# Patient Record
(demographics unavailable — no encounter records)

---

## 2024-10-28 NOTE — PHYSICAL EXAM
[Alert] : alert [Normocephalic] : normocephalic [Flat Open Anterior [place]] : flat open anterior fontanelle [PERRL] : PERRL [Red Reflex Bilateral] : red reflex bilateral [Normally Placed Ears] : normally placed ears [Auricles Well Formed] : auricles well formed [Nares Patent] : nares patent [Palate Intact] : palate intact [Uvula Midline] : uvula midline [Supple, full passive range of motion] : supple, full passive range of motion [Symmetric Chest Rise] : symmetric chest rise [Clear to Auscultation Bilaterally] : clear to auscultation bilaterally [Regular Rate and Rhythm] : regular rate and rhythm [S1, S2 present] : S1, S2 present [+2 Femoral Pulses] : +2 femoral pulses [Soft] : soft [Bowel Sounds] : bowel sounds present [Umbilical Stump Dry, Clean, Intact] : umbilical stump dry, clean, intact [Normal external genitalia] : normal external genitalia [Patent Vagina] : patent vagina [Patent] : patent [Normally Placed] : normally placed [No Abnormal Lymph Nodes Palpated] : no abnormal lymph nodes palpated [Symmetric Flexed Extremities] : symmetric flexed extremities [Startle Reflex] : startle reflex present [Suck Reflex] : suck reflex present [Rooting] : rooting reflex present [Palmar Grasp] : palmar grasp present [Plantar Grasp] : plantar reflex present [Symmetric [place]] : symmetric [place] [Acute Distress] : no acute distress [Icteric sclera] : nonicteric sclera [Discharge] : no discharge [Palpable Masses] : no palpable masses [Murmurs] : no murmurs [Tender] : nontender [Distended] : not distended [Clitoromegaly] : no clitoromegaly [Nye-Ortolani] : negative Nye-Ortolani [Tuft of Hair] : no tuft of hair [Jaundice] : not jaundice

## 2024-10-28 NOTE — DISCUSSION/SUMMARY
[Normal Growth] : growth [Normal Development] : developmental [No Elimination Concerns] : elimination [Continue Regimen] : feeding [Normal Sleep Pattern] : sleep [Term Infant] : term infant [None] : no known medical problems [ Transition] :  transition [ Care] :  care [Nutritional Adequacy] : nutritional adequacy [Parental Well-Being] : parental well-being [Safety] : safety [Hepatitis B In Hospital] : Hepatitis B administered while in the hospital [No Vaccines] : no vaccines needed [Mother] : mother [Father] : father [FreeTextEntry1] : Venecia is an ex-40.1 F born via uncomplicated  now on DOL 5 here for  visit. She is doing well. She is up 1 oz from discharge weight but is still down 2.3% from birth weight. MOC is breastfeeding ad nohemi and supplementing with formula. She is stooling and voiding appropriately. No concerns on exam today. Paternal concern for jaundice, but no jaundice or scleral icterus on exam. TcB today is 10.2, below phototherapy threshold. She received both Hep B and Nirsevimab vaccination while in the  nursery, no vaccinations needed today. MOC is interested in lactation consultation but cannot see them today as she has another appointment. Will see lactation at next visit.   Recommend exclusive breastfeeding, 8-12 feedings per day. Mother should continue prenatal vitamins and avoid alcohol. If formula is needed, recommend iron-fortified formulations, 2-4 oz every 2-3 hrs. When in car, patient should be in rear-facing car seat in back seat. Put baby to sleep on back, in own crib with no loose or soft bedding. Help baby to develop sleep and feeding routines. May offer pacifier if needed. Start tummy time when awake. Limit baby's exposure to others, especially those with fever or unknown vaccine status. Parents counseled to call if rectal temperature >100.4 degrees F.   SDOH domains were screened and scored. Patient to return in 1 week for weight check.

## 2024-10-28 NOTE — DEVELOPMENTAL MILESTONES
[Normal Development] : Normal Development [None] : none [Makes brief eye contact] : makes brief eye contact [Cries with discomfort] : cries with discomfort [Calms to adult voice] : calms to adult voice [Reflexively moves arms and legs] : reflexively moves arms and legs [Turns head to side when on stomach] : turns head to side when on stomach [Holds fingers closed] : holds fingers closed [Grasps reflexively] : grasp reflexively [Passed] : passed [FreeTextEntry2] : 6

## 2024-10-28 NOTE — PHYSICAL EXAM
[Alert] : alert [Normocephalic] : normocephalic [Flat Open Anterior [place]] : flat open anterior fontanelle [PERRL] : PERRL [Red Reflex Bilateral] : red reflex bilateral [Normally Placed Ears] : normally placed ears [Auricles Well Formed] : auricles well formed [Nares Patent] : nares patent [Palate Intact] : palate intact [Uvula Midline] : uvula midline [Supple, full passive range of motion] : supple, full passive range of motion [Symmetric Chest Rise] : symmetric chest rise [Clear to Auscultation Bilaterally] : clear to auscultation bilaterally [Regular Rate and Rhythm] : regular rate and rhythm [S1, S2 present] : S1, S2 present [+2 Femoral Pulses] : +2 femoral pulses [Soft] : soft [Bowel Sounds] : bowel sounds present [Umbilical Stump Dry, Clean, Intact] : umbilical stump dry, clean, intact [Normal external genitalia] : normal external genitalia [Patent Vagina] : patent vagina [Patent] : patent [Normally Placed] : normally placed [No Abnormal Lymph Nodes Palpated] : no abnormal lymph nodes palpated [Symmetric Flexed Extremities] : symmetric flexed extremities [Startle Reflex] : startle reflex present [Suck Reflex] : suck reflex present [Rooting] : rooting reflex present [Palmar Grasp] : palmar grasp present [Plantar Grasp] : plantar reflex present [Symmetric [place]] : symmetric [place] [Acute Distress] : no acute distress [Icteric sclera] : nonicteric sclera [Discharge] : no discharge [Palpable Masses] : no palpable masses [Murmurs] : no murmurs [Tender] : nontender [Distended] : not distended [Clitoromegaly] : no clitoromegaly [Nye-Ortolani] : negative Nye-Ortolani [Tuft of Hair] : no tuft of hair [Jaundice] : not jaundice

## 2024-10-28 NOTE — HISTORY OF PRESENT ILLNESS
[Hepatitis B Vaccine Given] : Hepatitis B vaccine given [Nirsevimab Given] : Nirsevimab given  [Breast milk] : breast milk [Normal] : Normal [___ voids per day] : [unfilled] voids per day [Frequency of stools: ___] : Frequency of stools: [unfilled]  stools [per day] : per day. [Yellow] : yellow [Seedy] : seedy [Water heater temperature set at <120 degrees F] : Water heater temperature set at <120 degrees F [Rear facing car seat in back seat] : Rear facing car seat in back seat [Carbon Monoxide Detectors] : Carbon monoxide detectors at home [Smoke Detectors] : Smoke detectors at home. [In Bassinet/Crib] : sleeps in bassinet/crib [On back] : sleeps on back [No] : No cigarette smoke exposure [RSV vaccine] : RSV vaccine not received by mother at least 14 days prior to delivery [HepBsAG] : HepBsAg negative [HIV] : HIV negative [HepC] : Hepatitis C negative [GBS] : GBS negative [Rubella (Immune)] : Rubella not immune [VDRL/RPR (Reactive)] : VDRL/RPR nonreactive [de-identified] : 30 [Co-sleeping] : no co-sleeping [Loose bedding, pillow, toys, and/or bumpers in crib] : no loose bedding, pillow, toys, and/or bumpers in crib [Pacifier] : Not using pacifier [Exposure to electronic nicotine delivery system] : No exposure to electronic nicotine delivery system [FreeTextEntry7] : Venecia is an ex-40.1 F born via uncomplicated  now on DOL 5 here to establish care. Prenatal history of oligohydramnios. Prenatal labs were significant for rubella nonimmune. No complications during delivery and unremarkable nursery stay.  [de-identified] : Parental concern for mild yellowing of skin. [de-identified] : Breastfeeding ad nohemi, 15minutes on each breast. Enfamil gentlease. 3ounces every 3hrs.  [de-identified] : Hep B given on 10/23/24. Nirsevimab given on 10/24/24.

## 2024-10-28 NOTE — HISTORY OF PRESENT ILLNESS
[Hepatitis B Vaccine Given] : Hepatitis B vaccine given [Nirsevimab Given] : Nirsevimab given  [Breast milk] : breast milk [Normal] : Normal [___ voids per day] : [unfilled] voids per day [Frequency of stools: ___] : Frequency of stools: [unfilled]  stools [per day] : per day. [Yellow] : yellow [Seedy] : seedy [Water heater temperature set at <120 degrees F] : Water heater temperature set at <120 degrees F [Rear facing car seat in back seat] : Rear facing car seat in back seat [Carbon Monoxide Detectors] : Carbon monoxide detectors at home [Smoke Detectors] : Smoke detectors at home. [In Bassinet/Crib] : sleeps in bassinet/crib [On back] : sleeps on back [No] : No cigarette smoke exposure [RSV vaccine] : RSV vaccine not received by mother at least 14 days prior to delivery [HepBsAG] : HepBsAg negative [HIV] : HIV negative [HepC] : Hepatitis C negative [GBS] : GBS negative [Rubella (Immune)] : Rubella not immune [VDRL/RPR (Reactive)] : VDRL/RPR nonreactive [de-identified] : 30 [Co-sleeping] : no co-sleeping [Loose bedding, pillow, toys, and/or bumpers in crib] : no loose bedding, pillow, toys, and/or bumpers in crib [Pacifier] : Not using pacifier [Exposure to electronic nicotine delivery system] : No exposure to electronic nicotine delivery system [FreeTextEntry7] : Venecia is an ex-40.1 F born via uncomplicated  now on DOL 5 here to establish care. Prenatal history of oligohydramnios. Prenatal labs were significant for rubella nonimmune. No complications during delivery and unremarkable nursery stay.  [de-identified] : Parental concern for mild yellowing of skin. [de-identified] : Breastfeeding ad nohemi, 15minutes on each breast. Enfamil gentlease. 3ounces every 3hrs.  [de-identified] : Hep B given on 10/23/24. Nirsevimab given on 10/24/24.

## 2024-11-01 NOTE — HISTORY OF PRESENT ILLNESS
[de-identified] : concerns about umbilicus [FreeTextEntry6] : Venecia is a 9 day old F coming in for acute visit for parental concerns about umbilicus:   Seen in clinic yesterday for  visit and have weight check scheduled for next week.  Parents were changing diaper and noted some smell and some discharge coming out of umbilicus and noted mild bleeding No fever. PO intake normal - infant breastfeeding and taking some formula (family using Baby Breeza for mixing formula) Urine and stool diapers appropriate.

## 2024-11-01 NOTE — HISTORY OF PRESENT ILLNESS
[de-identified] : concerns about umbilicus [FreeTextEntry6] : Venecia is a 9 day old F coming in for acute visit for parental concerns about umbilicus:   Seen in clinic yesterday for  visit and have weight check scheduled for next week.  Parents were changing diaper and noted some smell and some discharge coming out of umbilicus and noted mild bleeding No fever. PO intake normal - infant breastfeeding and taking some formula (family using Baby Breeza for mixing formula) Urine and stool diapers appropriate.

## 2024-11-01 NOTE — DISCUSSION/SUMMARY
[FreeTextEntry1] : Venecia is a 6 day old F coming in for acute visit due to parental concerns about umbilicus. Well-appearing umbilical cord that is healing well on exam. Discussed with parents that is normal to have some minimal discharge or bleeding as cord comes off - no pus or active bleeding noted on exam. Discussed with parents about types of formula available, and cautioned against using Baby Breeza. Family interested in seeing lactation during visit yesterday, but were not able to. Will have lactation see parents while in office today.

## 2024-11-01 NOTE — PHYSICAL EXAM
[NL] : warm, clear [FreeTextEntry9] : + Umbilical cord attached, starting to come off. No discharge. No active bleeding noted.

## 2024-11-05 NOTE — HISTORY OF PRESENT ILLNESS
[de-identified] : weight check [FreeTextEntry6] : 12 d/o F, ex-40 wkr, presenting for weight check. Pt has been feeding EHM POAL supplemented with Enfamil gentlease formula 4-5oz q 3-5 hours. Pt has been having more frequent spit ups after the supplemental feeding. Mom feeding 10-15 mins on each breast. Pumps 2-4 oz.   Pt otherwise has had no fevers, cough, congestion, respiratory distress, projectile vomiting, rashes, abnormal changes in behavior.

## 2024-11-05 NOTE — HISTORY OF PRESENT ILLNESS
[de-identified] : weight check [FreeTextEntry6] : 12 d/o F, ex-40 wkr, presenting for weight check. Pt has been feeding EHM POAL supplemented with Enfamil gentlease formula 4-5oz q 3-5 hours. Pt has been having more frequent spit ups after the supplemental feeding. Mom feeding 10-15 mins on each breast. Pumps 2-4 oz.   Pt otherwise has had no fevers, cough, congestion, respiratory distress, projectile vomiting, rashes, abnormal changes in behavior.

## 2024-11-05 NOTE — DISCUSSION/SUMMARY
[FreeTextEntry1] : 12 d/o F, ex-40 wkr, presenting for weight check. Pt has regained her birth-weight, and has been gaining ~47g/day since last visit 7 days ago. Discussed with parents that 2-4oz is enough milk for baby at this age. 5oz would be considered over feeding and likely contributing to pt's more frequent spit ups. Encouraged to console with pacifiers instead of feeding after pt has taken up to 4oz. Feeds should also be ad nohemi and no further than 3-4 hours. Pt should be woken up at night for feeds. Lactation information provided. RTC in 2 weeks for 1m WCC.   Recommend exclusive breastfeeding, 8-12 feedings per day. Mother should continue prenatal vitamins and avoid alcohol. If formula is needed, recommend iron-fortified formulations, 2-4 oz every 2-3 hrs. When in car, patient should be in rear-facing car seat in back seat. Put baby to sleep on back, in own crib with no loose or soft bedding. Help baby to develop sleep and feeding routines. May offer pacifier if needed. Start tummy time when awake. Limit baby's exposure to others, especially those with fever or unknown vaccine status. Parents counseled to call if rectal temperature >100.4 degrees F.

## 2024-11-25 NOTE — DISCUSSION/SUMMARY
[FreeTextEntry1] : Growing well, meeting milestones, normal exam, reviewed importance of feeding every 2-3 hours (every 1 hour feeds can lead to behavior issues), reviewed natural course of  acne, call office for rashes that pop with discharge.  - Vital signs reviewed and normal - Routine age appropriate bright futures topics discussed, including but not limited to the topics below - Reviewed feeding, continue every 2 to 4 hours. Do not need to wake baby to feed once regained birth weight - Stools should be light yellow. Call clinic if stools have blood, are black, or white. Baby needs to pee at least three times daily - Give Vitamin D 400 units daily if breast feeding - Do not submerge infant in bath until umbilical stump has fallen off / healed and is dry - Start supervised tummy time for 20 minutes daily, does not have to be all at once - Reviewed safe sleep and sudden infant death syndrome risk. Place baby on back to sleep in own crib without extra bedding - Use rear facing car seat in the back of the car when travelling in a motor vehicle as this is the safest position for the baby - Monitor for signs of fever, check rectal temperature if baby is hard to wake up, having persistent trouble feeding, or warm. If >100.4F or less than 97F, must go to an emergency department for evaluation - Safety measures: never leave baby alone in tub/high place/alone, and do not shake baby - Call office for any concerning symptoms or parental concerns - AAP Bright Futures handout provided today - Follow up for 2 month check up

## 2024-11-25 NOTE — PHYSICAL EXAM
[Alert] : alert [Acute Distress] : no acute distress [Normocephalic] : normocephalic [Flat Open Anterior [place]] : flat open anterior fontanelle [PERRL] : PERRL [Red Reflex Bilateral] : red reflex bilateral [Normally Placed Ears] : normally placed ears [Auricles Well Formed] : auricles well formed [Clear Tympanic membranes] : clear tympanic membranes [Light reflex present] : light reflex present [Bony landmarks visible] : bony landmarks visible [Discharge] : no discharge [Nares Patent] : nares patent [Palate Intact] : palate intact [Uvula Midline] : uvula midline [Supple, full passive range of motion] : supple, full passive range of motion [Palpable Masses] : no palpable masses [Symmetric Chest Rise] : symmetric chest rise [Clear to Auscultation Bilaterally] : clear to auscultation bilaterally [Regular Rate and Rhythm] : regular rate and rhythm [S1, S2 present] : S1, S2 present [Murmurs] : no murmurs [+2 Femoral Pulses] : +2 femoral pulses [Soft] : soft [Tender] : nontender [Distended] : not distended [Bowel Sounds] : bowel sounds present [Hepatomegaly] : no hepatomegaly [Splenomegaly] : no splenomegaly [Normal external genitailia] : normal external genitalia [Clitoromegaly] : no clitoromegaly [Patent Vagina] : vagina patent [Normally Placed] : normally placed [No Abnormal Lymph Nodes Palpated] : no abnormal lymph nodes palpated [Nye-Ortolani] : negative Nye-Ortolani [Symmetric Flexed Extremities] : symmetric flexed extremities [Spinal Dimple] : no spinal dimple [Tuft of Hair] : no tuft of hair [Startle Reflex] : startle reflex present [Suck Reflex] : suck reflex present [Rooting] : rooting reflex present [Palmar Grasp] : palmar grasp reflex present [Plantar Grasp] : plantar grasp reflex present [Symmetric [place]] : symmetric [place] [Jaundice] : no jaundice [Rash and/or lesion present] : no rash/lesion [de-identified] : Mild bilateral  acne without crusting/discharge/vesicles

## 2024-11-25 NOTE — HISTORY OF PRESENT ILLNESS
[FreeTextEntry1] : Here with Mom and Dad for 1 month visit Questions about congestion, rest of family sick, no fevers No trouble feeding   Great weight gain Feeding EBM and BF  Spit ups daily, NBNB Sometimes supplements with formula Feeds between 1-3 hours Stools never white  Infant sleeps in bassinet/crib, on back, firm mattress, and without additional loose items. No co-sleeping. Car seat is rear facing in back of the car.

## 2024-11-25 NOTE — DEVELOPMENTAL MILESTONES
[Passed] : passed [FreeTextEntry2] : 1 [Normal Development] : Normal Development [Calms when picked up or spoken to] : calms when picked up or spoken to [Looks briefly at objects] : looks briefly at objects [Alerts to unexpected sound] : alerts to unexpected sound [Makes brief short vowel sounds] : makes brief short vowel sounds [Holds chin up in prone] : holds chin up in prone [Holds fingers more open at rest] : holds fingers more open at rest

## 2025-01-03 NOTE — DISCUSSION/SUMMARY
[Normal Growth] : growth [Normal Development] : development  [No Elimination Concerns] : elimination [Continue Regimen] : feeding [No Skin Concerns] : skin [Normal Sleep Pattern] : sleep [None] : no medical problems [Anticipatory Guidance Given] : Anticipatory guidance addressed as per the history of present illness section [Parental (Maternal) Well-Being] : parental (maternal) well-being [Infant-Family Synchrony] : infant-family synchrony [Nutritional Adequacy] : nutritional adequacy [Infant Behavior] : infant behavior [Safety] : safety [Age Approp Vaccines] : Age appropriate vaccines administered [No Medications] : ~He/She~ is not on any medications [Parent/Guardian] : Parent/Guardian [] : The components of the vaccine(s) to be administered today are listed in the plan of care. The disease(s) for which the vaccine(s) are intended to prevent and the risks have been discussed with the caretaker.  The risks are also included in the appropriate vaccination information statements which have been provided to the patient's caregiver.  The caregiver has given consent to vaccinate. [FreeTextEntry1] :  2 month old infant here for WCC Doing well  Recommend exclusive breastfeeding, 8-12 feedings per day. Mother should continue prenatal vitamins and avoid alcohol.  If formula is needed, recommend iron-fortified formulations, 2-4 oz every 3-4 hrs.  When in car, patient should be in rear-facing car seat in back seat.  Put baby to sleep on back, in own crib with no loose or soft bedding.  Help baby to maintain sleep and feeding routines.  May offer pacifier if needed.  Continue tummy time when awake.  Parents counseled to call if rectal temperature >100.4 degrees F.  Vaccines given - .Vaxelis ([id number]-Hep B), PCV, Rotavirus  All questions answered. RTC in 2 months for C

## 2025-01-03 NOTE — HISTORY OF PRESENT ILLNESS
[Parents] : parents [___ voids per day] : [unfilled] voids per day [Frequency of stools: ___] : Frequency of stools: [unfilled]  stools [In Bassinet/Crib] : sleeps in bassinet/crib [On back] : sleeps on back [No] : No cigarette smoke exposure [Rear facing car seat in back seat] : Rear facing car seat in back seat [Carbon Monoxide Detectors] : Carbon monoxide detectors at home [Smoke Detectors] : Smoke detectors at home. [NO] : No [Loose bedding, pillow, toys, and/or bumpers in crib] : no loose bedding, pillow, toys, and/or bumpers in crib [Pacifier use] : not using pacifier [Exposure to electronic nicotine delivery system] : No exposure to electronic nicotine delivery system [de-identified] : EHM/formula 4-05 oz every 3-4 hours

## 2025-01-03 NOTE — DEVELOPMENTAL MILESTONES
[Normal Development] : Normal Development [Smiles responsively] : smiles responsively [Vocalizes with simple cooing] : vocalizes with simple cooing [Lifts head and chest in prone] : lifts head and chest in prone [Opens and shuts hands] : opens and shuts hands [Passed] : passed [FreeTextEntry2] : 4

## 2025-01-03 NOTE — PHYSICAL EXAM
[Alert] : alert [Acute Distress] : no acute distress [Normocephalic] : normocephalic [Flat Open Anterior [place]] : flat open anterior fontanelle [PERRL] : PERRL [Red Reflex Bilateral] : red reflex bilateral [Normally Placed Ears] : normally placed ears [Auricles Well Formed] : auricles well formed [Clear Tympanic membranes] : clear tympanic membranes [Light reflex present] : light reflex present [Bony landmarks visible] : bony landmarks visible [Discharge] : no discharge [Nares Patent] : nares patent [Palate Intact] : palate intact [Uvula Midline] : uvula midline [Supple, full passive range of motion] : supple, full passive range of motion [Palpable Masses] : no palpable masses [Symmetric Chest Rise] : symmetric chest rise [Clear to Auscultation Bilaterally] : clear to auscultation bilaterally [Regular Rate and Rhythm] : regular rate and rhythm [S1, S2 present] : S1, S2 present [Murmurs] : no murmurs [+2 Femoral Pulses] : +2 femoral pulses [Soft] : soft [Tender] : nontender [Distended] : not distended [Bowel Sounds] : bowel sounds present [Hepatomegaly] : no hepatomegaly [Splenomegaly] : no splenomegaly [Normal external genitailia] : normal external genitalia [Clitoromegaly] : no clitoromegaly [Patent Vagina] : vagina patent [Normally Placed] : normally placed [No Abnormal Lymph Nodes Palpated] : no abnormal lymph nodes palpated [Nye-Ortolani] : negative Nye-Ortolani [Symmetric Flexed Extremities] : symmetric flexed extremities [Spinal Dimple] : no spinal dimple [Tuft of Hair] : no tuft of hair [Startle Reflex] : startle reflex present [Suck Reflex] : suck reflex present [Rooting] : rooting reflex present [Palmar Grasp] : palmar grasp reflex present [Plantar Grasp] : plantar grasp reflex present [Symmetric [place]] : symmetric [place] [Rash and/or lesion present] : no rash/lesion

## 2025-02-27 NOTE — HISTORY OF PRESENT ILLNESS
[Breast milk] : breast milk [Hours between feeds ___] : Child is fed every [unfilled] hours [Normal] : Normal [___ voids per day] : [unfilled] voids per day [Frequency of stools: ___] : Frequency of stools: [unfilled]  stools [Green/brown] : green/brown [Yellow] : yellow [In Bassinet/Crib] : sleeps in bassinet/crib [On back] : sleeps on back [Loose bedding, pillow, toys, and/or bumpers in crib] : loose bedding, pillow, toys, and/or bumpers in crib [Tummy time] : tummy time [Screen time only for video chatting] : screen time only for video chatting [No] : No cigarette smoke exposure [Water heater temperature set at <120 degrees F] : Water heater temperature set at <120 degrees F [Rear facing car seat in back seat] : Rear facing car seat in back seat [Carbon Monoxide Detectors] : Carbon monoxide detectors at home [Smoke Detectors] : Smoke detectors at home. [Mother] : mother [Co-sleeping] : no co-sleeping [Sleeps 12-16 hours per 24 hours (including naps)] : Does not sleep 12-16 hours per 24 hours (including naps) [Pacifier use] : not using pacifier [de-identified] : 4 ounces every 2-3 hours

## 2025-02-27 NOTE — DISCUSSION/SUMMARY
[Normal Growth] : growth [Normal Development] : development  [No Elimination Concerns] : elimination [Continue Regimen] : feeding [No Skin Concerns] : skin [Normal Sleep Pattern] : sleep [None] : no medical problems [Anticipatory Guidance Given] : Anticipatory guidance addressed as per the history of present illness section [Family Functioning] : family functioning [Nutritional Adequacy and Growth] : nutritional adequacy and growth [Infant Development] : infant development [Oral Health] : oral health [Safety] : safety [Age Approp Vaccines] : Age appropriate vaccines administered [No Medications] : ~He/She~ is not on any medications [Parent/Guardian] : Parent/Guardian [FreeTextEntry1] : 4M old here for WCC [id number](Pentacel), Prevnar, and Rota administered [place] score 0 RTC in 2 months for WCC/PRN  HM Recommend breastfeeding, 8-12 feedings per day. Mother should continue prenatal vitamins and avoid alcohol. If formula is needed, recommend iron-fortified formulations, 2-4 oz every 3-4 hrs. Cereal may be introduced using a spoon and bowl. When in car, patient should be in rear-facing car seat in back seat. Put baby to sleep on back, in own crib with no loose or soft bedding. Lower crib matress. Help baby to maintain sleep and feeding routines. May offer pacifier if needed. Continue tummy time when awake.

## 2025-02-27 NOTE — DISCUSSION/SUMMARY
[Normal Growth] : growth [Normal Development] : development  [No Elimination Concerns] : elimination [Continue Regimen] : feeding [No Skin Concerns] : skin [Normal Sleep Pattern] : sleep [None] : no medical problems [Anticipatory Guidance Given] : Anticipatory guidance addressed as per the history of present illness section [Family Functioning] : family functioning [Nutritional Adequacy and Growth] : nutritional adequacy and growth [Infant Development] : infant development [Oral Health] : oral health [Safety] : safety [Age Approp Vaccines] : Age appropriate vaccines administered [No Medications] : ~He/She~ is not on any medications [Parent/Guardian] : Parent/Guardian [FreeTextEntry1] : 4M old here for WCC [id number](Pentacel), Prevnar, and Rota administered [place] score 0 RTC in 2 months for WCC/PRN  HM Recommend breastfeeding, 8-12 feedings per day. Mother should continue prenatal vitamins and avoid alcohol. If formula is needed, recommend iron-fortified formulations, 2-4 oz every 3-4 hrs. Cereal may be introduced using a spoon and bowl. When in car, patient should be in rear-facing car seat in back seat. Put baby to sleep on back, in own crib with no loose or soft bedding. Lower crib matress. Help baby to maintain sleep and feeding routines. May offer pacifier if needed. Continue tummy time when awake.

## 2025-02-27 NOTE — PHYSICAL EXAM
[Alert] : alert [Normocephalic] : normocephalic [Flat Open Anterior [place]] : flat open anterior fontanelle [Red Reflex] : red reflex bilateral [PERRL] : PERRL [Normally Placed Ears] : normally placed ears [Auricles Well Formed] : auricles well formed [Clear Tympanic membranes] : clear tympanic membranes [Light reflex present] : light reflex present [Bony landmarks visible] : bony landmarks visible [Nares Patent] : nares patent [Palate Intact] : palate intact [Uvula Midline] : uvula midline [Symmetric Chest Rise] : symmetric chest rise [Clear to Auscultation Bilaterally] : clear to auscultation bilaterally [Regular Rate and Rhythm] : regular rate and rhythm [S1, S2 present] : S1, S2 present [+2 Femoral Pulses] : (+) 2 femoral pulses [Soft] : soft [Bowel Sounds] : bowel sounds present [Normal External Genitalia] : normal external genitalia [Normal Vaginal Introitus] : normal vaginal introitus [Patent] : patent [Normally Placed] : normally placed [No Abnormal Lymph Nodes Palpated] : no abnormal lymph nodes palpated [Startle Reflex] : startle reflex present [Plantar Grasp] : plantar grasp reflex present [Symmetric [place]] : symmetric bayron [Acute Distress] : no acute distress [Discharge] : no discharge [Palpable Masses] : no palpable masses [Murmurs] : no murmurs [Tender] : nontender [Distended] : nondistended [Hepatomegaly] : no hepatomegaly [Splenomegaly] : no splenomegaly [Clitoromegaly] : no clitoromegaly [Nye-Ortolani] : negative Nye-Ortolani [Allis Sign] : negative Allis sign [Spinal Dimple] : no spinal dimple [Tuft of Hair] : no tuft of hair [Rash or Lesions] : no rash/lesions

## 2025-02-27 NOTE — HISTORY OF PRESENT ILLNESS
[Breast milk] : breast milk [Hours between feeds ___] : Child is fed every [unfilled] hours [Normal] : Normal [___ voids per day] : [unfilled] voids per day [Frequency of stools: ___] : Frequency of stools: [unfilled]  stools [Green/brown] : green/brown [Yellow] : yellow [In Bassinet/Crib] : sleeps in bassinet/crib [On back] : sleeps on back [Loose bedding, pillow, toys, and/or bumpers in crib] : loose bedding, pillow, toys, and/or bumpers in crib [Tummy time] : tummy time [Screen time only for video chatting] : screen time only for video chatting [No] : No cigarette smoke exposure [Water heater temperature set at <120 degrees F] : Water heater temperature set at <120 degrees F [Rear facing car seat in back seat] : Rear facing car seat in back seat [Carbon Monoxide Detectors] : Carbon monoxide detectors at home [Smoke Detectors] : Smoke detectors at home. [Mother] : mother [Co-sleeping] : no co-sleeping [Sleeps 12-16 hours per 24 hours (including naps)] : Does not sleep 12-16 hours per 24 hours (including naps) [Pacifier use] : not using pacifier [de-identified] : 4 ounces every 2-3 hours

## 2025-02-27 NOTE — PHYSICAL EXAM
[Alert] : alert [Normocephalic] : normocephalic [Flat Open Anterior [place]] : flat open anterior fontanelle [Red Reflex] : red reflex bilateral [PERRL] : PERRL [Normally Placed Ears] : normally placed ears [Auricles Well Formed] : auricles well formed [Clear Tympanic membranes] : clear tympanic membranes [Light reflex present] : light reflex present [Bony landmarks visible] : bony landmarks visible [Nares Patent] : nares patent [Palate Intact] : palate intact [Uvula Midline] : uvula midline [Symmetric Chest Rise] : symmetric chest rise [Clear to Auscultation Bilaterally] : clear to auscultation bilaterally [Regular Rate and Rhythm] : regular rate and rhythm [S1, S2 present] : S1, S2 present [+2 Femoral Pulses] : (+) 2 femoral pulses [Soft] : soft [Bowel Sounds] : bowel sounds present [Normal External Genitalia] : normal external genitalia [Normal Vaginal Introitus] : normal vaginal introitus [Patent] : patent [Normally Placed] : normally placed [No Abnormal Lymph Nodes Palpated] : no abnormal lymph nodes palpated [Startle Reflex] : startle reflex present [Plantar Grasp] : plantar grasp reflex present [Symmetric Bayron] : symmetric bayron [Acute Distress] : no acute distress [Discharge] : no discharge [Palpable Masses] : no palpable masses [Murmurs] : no murmurs [Tender] : nontender [Distended] : nondistended [Hepatomegaly] : no hepatomegaly [Splenomegaly] : no splenomegaly [Clitoromegaly] : no clitoromegaly [Nye-Ortolani] : negative Nye-Ortolani [Allis Sign] : negative Allis sign [Spinal Dimple] : no spinal dimple [Tuft of Hair] : no tuft of hair [Rash or Lesions] : no rash/lesions

## 2025-04-24 NOTE — PHYSICAL EXAM
[Normocephalic] : normocephalic [Red Reflex] : red reflex bilateral [Normally Placed Ears] : normally placed ears [Clear Tympanic membranes] : clear tympanic membranes [Palate Intact] : palate intact [Supple, full passive range of motion] : supple, full passive range of motion [Symmetric Chest Rise] : symmetric chest rise [Normoactive Precordium] : normoactive precordium [Regular Rate and Rhythm] : regular rate and rhythm [Soft] : soft [Normal External Genitalia] : normal external genitalia [No Abnormal Lymph Nodes Palpated] : no abnormal lymph nodes palpated [Straight] : straight [Clear to Auscultation Bilaterally] : clear to auscultation bilaterally [Cranial Nerves Grossly Intact] : cranial nerves grossly intact [Acute Distress] : no acute distress [Discharge] : no discharge [Tender] : nontender [Rash or Lesions] : no rash/lesions

## 2025-04-24 NOTE — REVIEW OF SYSTEMS
[Inconsolable] : consolable [Diaphoresis] : not diaphoretic [Wheezing] : no wheezing [Constipation] : no constipation [Gaseous] : not gaseous [Abnormal Movements] :  no abnormal movements [Rash] : no rash

## 2025-04-24 NOTE — HISTORY OF PRESENT ILLNESS
[Mother] : mother [Father] : father [Breast milk] : breast milk [Formula ___ oz in 24hrs] : [unfilled] oz of formula in 24 hours [Hours between feeds ___] : Child is fed every [unfilled] hours [___ voids per day] : [unfilled] voids per day [Frequency of stools: ___] : Frequency of stools: [unfilled]  stools [per day] : per day. [Yellow] : yellow [Loose] : loose consistency [In Bassinet/Crib] : sleeps in bassinet/crib [Sleeps 12-16 hours per 24 hours (including naps)] : sleeps 12-16 hours per 24 hours (including naps) [Tummy time] : tummy time [No] : No cigarette smoke exposure [Rear facing car seat in back seat] : Rear facing car seat in back seat [Carbon Monoxide Detectors] : Carbon monoxide detectors at home [Smoke Detectors] : Smoke detectors at home. [NO] : No [On back] : sleeps on back [Vitamins ___] : no vitamins [Co-sleeping] : no co-sleeping [Loose bedding, pillow, toys, and/or bumpers in crib] : no loose bedding, pillow, toys, and/or bumpers in crib [Pacifier use] : not using pacifier [de-identified] : Mom shares breast milk has been seeming lighter the past week, concerned if she should be supplementing more with formula [de-identified] : Mom pumps, pt doesn't latch  [de-identified] : Pt will sometimes sleep on her stomach  [de-identified] : 10 -15 minutes intervals 4-5 times per day  [FreeTextEntry1] : Parents presenting with 6 month old baby for WCC. Parents interested in starting solid food. Report that baby will sometimes sleep on her stomach.

## 2025-04-24 NOTE — DEVELOPMENTAL MILESTONES
[Begins to turn when name called] : begins to turn when name called [Babbles] : babbles [Sits briefly without support] : sits briefly without support [Reaches for object and transfers] : reaches for object and transfers [[place] small object on surface] : bangs small object on surface [Passed] : passed [Pats or smiles at reflection] : pats or smiles at reflection [Rolls over prone to supine] : does not roll over prone to supine [Rakes small object with 4 fingers] : does not rake small object with 4 fingers [FreeTextEntry2] : 1

## 2025-04-24 NOTE — DISCUSSION/SUMMARY
[Normal Growth] : growth [Normal Development] : development [No Elimination Concerns] : elimination [No Feeding Concerns] : feeding [Normal Sleep Pattern] : sleep [Add Food/Vitamin] : Add Food/Vitamin: [No Medications] : ~He/She~ is not on any medications

## 2025-07-24 NOTE — HISTORY OF PRESENT ILLNESS
[Mother] : mother [Expressed Breast milk ____ oz/feed] : [unfilled] oz of expressed breast milk per feed [Formula ___ oz/feed] : [unfilled] oz of formula per feed [Hours between feeds ___] : Child is fed every [unfilled] hours [Fruit] : fruit [Vegetables] : vegetables [Cereal] : cereal [Meat] : meat [Eggs] : eggs [Fish] : fish [Dairy] : dairy [Baby food] : baby food [Finger foods] : finger foods [Normal] : Normal [In Crib] : sleeps in crib [On back] : sleeps on back [Wakes up at night] : wakes up at night [Sleeps 12-16 hours per 24 hours (including naps)] : sleeps 12-16 hours per 24 hours (including naps) [Screen time only for video chatting] : screen time only for video chatting [No] : No exposure to electronic nicotine device [Up to date] : Up to date [Co-sleeping] : no co-sleeping [Pacifier use] : not using pacifier [FreeTextEntry1] : Venecia is a 9mo F presenting for WCC. Per parent, wondering how to wean patient off of night time bottle feeds. Patient also developed a rash on cheeks and nipple after eating egg whites a few weeks ago. Patient is eating, sleeping, behaving as normal with no elimination concerns. Denies recent illness, fever, cough, difficulty breathing, n/v/d, rash, known sick contacts, recent travel.

## 2025-07-24 NOTE — DISCUSSION/SUMMARY
[Normal Growth] : growth [Normal Development] : development [None] : No known medical problems [No Elimination Concerns] : elimination [No Feeding Concerns] : feeding [No Skin Concerns] : skin [Normal Sleep Pattern] : sleep [Family Adaptation] : family adaptation [Infant [place]] : infant independence [Feeding Routine] : feeding routine [Safety] : safety [No Medications] : ~He/She~ is not on any medications [Parent/Guardian] : parent/guardian [FreeTextEntry1] : Venecia is a 9mo F presenting for [facility]. Patient is growing and developing well for age. Has gained 2lbs since 6mo WC. No acute concerns today. Advised to introduce allergenic foods one at a time. Advised cutting out night time bottle feeds and increase solid intake during the day. Anticipatory guidance provided. Summer safety discussed.   Continue breast milk or formula as desired.  Increase table foods, 3 meals with 2-3 snacks per day.  No honey until after 1 year of age. Incorporate up to 6 oz of fluorinated water daily in a sippy cup.  Discussed weaning of bottle and pacifier.  Wipe teeth daily with washcloth.  When in car, patient should be in rear-facing car seat in back seat.  Put baby to sleep in own crib with no loose or soft bedding. Lower crib mattress.  Help baby to maintain consistent daily routines and sleep schedule.  Recognize stranger anxiety.  Ensure home is safe since baby is increasingly mobile. Be within arm's reach of baby at all times.  Use consistent, positive discipline. Avoid screen time. Read aloud to baby.   Labs: CBC, Pb RTC after 1st birthday for [facility]

## 2025-07-24 NOTE — PHYSICAL EXAM
[Alert] : alert [Playful] : playful [Normocephalic] : normocephalic [Flat Open Anterior [place]] : flat open anterior fontanelle [Red Reflex] : red reflex bilateral [PERRL] : PERRL [Normally Placed Ears] : normally placed ears [Auricles Well Formed] : auricles well formed [Clear Tympanic membranes] : clear tympanic membranes [Light reflex present] : light reflex present [Bony landmarks visible] : bony landmarks visible [Nares Patent] : nares patent [Palate Intact] : palate intact [Uvula Midline] : uvula midline [Supple, full passive range of motion] : supple, full passive range of motion [Symmetric Chest Rise] : symmetric chest rise [Clear to Auscultation Bilaterally] : clear to auscultation bilaterally [Regular Rate and Rhythm] : regular rate and rhythm [S1, S2 present] : S1, S2 present [+2 Femoral Pulses] : (+) 2 femoral pulses [Soft] : soft [Bowel Sounds] : bowel sounds present [Normal External Genitalia] : normal external genitalia [Normal Vaginal Introitus] : normal vaginal introitus [No Abnormal Lymph Nodes Palpated] : no abnormal lymph nodes palpated [Symmetric abduction and rotation of hips] : symmetric abduction and rotation of hips [Straight] : straight [Cranial Nerves Grossly Intact] : cranial nerves grossly intact [Acute Distress] : no acute distress [Excessive Tearing] : no excessive tearing [Discharge] : no discharge [Tooth Eruption] : no tooth eruption [Palpable Masses] : no palpable masses [Murmurs] : no murmurs [Tender] : nontender [Distended] : nondistended [Hepatomegaly] : no hepatomegaly [Splenomegaly] : no splenomegaly [Clitoromegaly] : no clitoromegaly [Allis Sign] : negative Allis sign [Rash or Lesions] : no rash/lesions

## 2025-07-24 NOTE — DEVELOPMENTAL MILESTONES
[Yes] : Completed. [Normal Development] : Normal Development [None] : none [Uses basic gestures] : uses basic gestures [Says "Eduard" or "Mama"] : says "Eduard" or "Mama" nonspecifically [Sits well without support] : sits well without support [Transitions between sitting and lying] : transitions between sitting and lying [Balances on hands and knees] : balances on hands and knees [Crawls] : crawls [Picks up small objects with 3 fingers] : picks up small objects with 3 fingers and thumb [Releases objects intentionally] : releases objects intentionally [[place] objects together] : bangs objects together [FreeTextEntry1] : Survey of Wellbeing of Young Children (SWYC) at 9 months Developmental Milestones 14 (<8 refer to Early Intervention, 9-11 below average, 12-15 average, 16+ advanced) at 9 months. Baby Pediatric Symptom Checklist 1 on inflexibility, 1 on irritability,4 on routines (normal <3 on any domain)